# Patient Record
Sex: FEMALE | Race: WHITE | URBAN - METROPOLITAN AREA
[De-identification: names, ages, dates, MRNs, and addresses within clinical notes are randomized per-mention and may not be internally consistent; named-entity substitution may affect disease eponyms.]

---
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
## 2022-01-12 ENCOUNTER — OFFICE VISIT (OUTPATIENT)
Dept: OBGYN CLINIC | Age: 22
End: 2022-01-12
Payer: COMMERCIAL

## 2022-01-12 VITALS
SYSTOLIC BLOOD PRESSURE: 112 MMHG | BODY MASS INDEX: 39.61 KG/M2 | DIASTOLIC BLOOD PRESSURE: 70 MMHG | HEIGHT: 64 IN | WEIGHT: 232 LBS

## 2022-01-12 DIAGNOSIS — Z30.431 ENCOUNTER FOR ROUTINE CHECKING OF INTRAUTERINE CONTRACEPTIVE DEVICE (IUD): ICD-10-CM

## 2022-01-12 DIAGNOSIS — N94.6 DYSMENORRHEA: Primary | ICD-10-CM

## 2022-01-12 PROCEDURE — 99212 OFFICE O/P EST SF 10 MIN: CPT | Performed by: ADVANCED PRACTICE MIDWIFE

## 2022-01-12 ASSESSMENT — ENCOUNTER SYMPTOMS
CONSTIPATION: 0
DIARRHEA: 0
NAUSEA: 0
ABDOMINAL PAIN: 0
COUGH: 0
SHORTNESS OF BREATH: 0
VOMITING: 0

## 2022-01-12 ASSESSMENT — PATIENT HEALTH QUESTIONNAIRE - PHQ9
SUM OF ALL RESPONSES TO PHQ QUESTIONS 1-9: 0
2. FEELING DOWN, DEPRESSED OR HOPELESS: 0
SUM OF ALL RESPONSES TO PHQ9 QUESTIONS 1 & 2: 0
SUM OF ALL RESPONSES TO PHQ QUESTIONS 1-9: 0
SUM OF ALL RESPONSES TO PHQ QUESTIONS 1-9: 0
1. LITTLE INTEREST OR PLEASURE IN DOING THINGS: 0
SUM OF ALL RESPONSES TO PHQ QUESTIONS 1-9: 0

## 2022-01-12 NOTE — PROGRESS NOTES
SUBJECTIVE:  Shantell Cat is a 23 y.o. female who presents here today for complaints of:      Chief Complaint   Patient presents with    Medication Check     IUD, no c/o     IUD Surveillance   Utilizing hormonal contraception to relieve uncomfortable menstrual symptoms. Happy with current contraceptive method, wishes to continue. Review of Systems   Respiratory: Negative for cough and shortness of breath. Gastrointestinal: Negative for abdominal pain, constipation, diarrhea, nausea and vomiting. Genitourinary: Negative for difficulty urinating, dysuria, menstrual problem, pelvic pain, vaginal bleeding and vaginal discharge. All other systems reviewed and are negative. OBJECTIVE:  Vitals:  /70   Ht 5' 4\" (1.626 m)   Wt 232 lb (105.2 kg)   BMI 39.82 kg/m²     Physical Exam  Appearance:  Normal appearance  Cardiovascular:  Normal rate, Capillary refill less than 2 seconds  Pulmonary:  Normal effort, no distress  Abdominal:  No tenderness  MS:  No Swelling, No dependent edema  Skin:  Warm, dry  Neuro:  Alert and oriented x3, reflexes normal.  Psychiatric:  Normal mood and behavior    ASSESSMENT & PLAN:   Diagnosis Orders   1. Dysmenorrhea     2. Encounter for routine checking of intrauterine contraceptive device (IUD)         1. Dysmenorrhea  Utilizing hormonal contraception to relieve uncomfortable menstrual symptoms. 2. IUD Surveillance  Happy with IUD, denies adverse side effects, wishes to continue. Return if symptoms worsen or fail to improve.     VILLA Tobar CNM

## 2023-01-13 ENCOUNTER — OFFICE VISIT (OUTPATIENT)
Dept: FAMILY MEDICINE CLINIC | Age: 23
End: 2023-01-13
Payer: COMMERCIAL

## 2023-01-13 VITALS
TEMPERATURE: 97.9 F | WEIGHT: 211.8 LBS | HEART RATE: 64 BPM | OXYGEN SATURATION: 99 % | DIASTOLIC BLOOD PRESSURE: 76 MMHG | HEIGHT: 64 IN | SYSTOLIC BLOOD PRESSURE: 108 MMHG | RESPIRATION RATE: 14 BRPM | BODY MASS INDEX: 36.16 KG/M2

## 2023-01-13 DIAGNOSIS — G43.009 MIGRAINE WITHOUT AURA AND WITHOUT STATUS MIGRAINOSUS, NOT INTRACTABLE: ICD-10-CM

## 2023-01-13 DIAGNOSIS — F41.9 ANXIETY: ICD-10-CM

## 2023-01-13 PROCEDURE — 99214 OFFICE O/P EST MOD 30 MIN: CPT

## 2023-01-13 RX ORDER — SERTRALINE HYDROCHLORIDE 100 MG/1
TABLET, FILM COATED ORAL
COMMUNITY
Start: 2022-11-08

## 2023-01-13 RX ORDER — RIMEGEPANT SULFATE 75 MG/75MG
TABLET, ORALLY DISINTEGRATING ORAL
COMMUNITY
Start: 2022-10-21

## 2023-01-13 RX ORDER — TOPIRAMATE 100 MG/1
TABLET, FILM COATED ORAL
COMMUNITY
Start: 2023-01-04

## 2023-01-13 SDOH — ECONOMIC STABILITY: FOOD INSECURITY: WITHIN THE PAST 12 MONTHS, THE FOOD YOU BOUGHT JUST DIDN'T LAST AND YOU DIDN'T HAVE MONEY TO GET MORE.: NEVER TRUE

## 2023-01-13 SDOH — ECONOMIC STABILITY: FOOD INSECURITY: WITHIN THE PAST 12 MONTHS, YOU WORRIED THAT YOUR FOOD WOULD RUN OUT BEFORE YOU GOT MONEY TO BUY MORE.: NEVER TRUE

## 2023-01-13 ASSESSMENT — PATIENT HEALTH QUESTIONNAIRE - PHQ9
4. FEELING TIRED OR HAVING LITTLE ENERGY: 0
3. TROUBLE FALLING OR STAYING ASLEEP: 0
7. TROUBLE CONCENTRATING ON THINGS, SUCH AS READING THE NEWSPAPER OR WATCHING TELEVISION: 0
5. POOR APPETITE OR OVEREATING: 0
10. IF YOU CHECKED OFF ANY PROBLEMS, HOW DIFFICULT HAVE THESE PROBLEMS MADE IT FOR YOU TO DO YOUR WORK, TAKE CARE OF THINGS AT HOME, OR GET ALONG WITH OTHER PEOPLE: 0
9. THOUGHTS THAT YOU WOULD BE BETTER OFF DEAD, OR OF HURTING YOURSELF: 0
SUM OF ALL RESPONSES TO PHQ QUESTIONS 1-9: 2
SUM OF ALL RESPONSES TO PHQ9 QUESTIONS 1 & 2: 2
2. FEELING DOWN, DEPRESSED OR HOPELESS: 1
1. LITTLE INTEREST OR PLEASURE IN DOING THINGS: 1
SUM OF ALL RESPONSES TO PHQ QUESTIONS 1-9: 2
6. FEELING BAD ABOUT YOURSELF - OR THAT YOU ARE A FAILURE OR HAVE LET YOURSELF OR YOUR FAMILY DOWN: 0
8. MOVING OR SPEAKING SO SLOWLY THAT OTHER PEOPLE COULD HAVE NOTICED. OR THE OPPOSITE, BEING SO FIGETY OR RESTLESS THAT YOU HAVE BEEN MOVING AROUND A LOT MORE THAN USUAL: 0

## 2023-01-13 ASSESSMENT — SOCIAL DETERMINANTS OF HEALTH (SDOH): HOW HARD IS IT FOR YOU TO PAY FOR THE VERY BASICS LIKE FOOD, HOUSING, MEDICAL CARE, AND HEATING?: NOT HARD AT ALL

## 2023-01-13 ASSESSMENT — COLUMBIA-SUICIDE SEVERITY RATING SCALE - C-SSRS
6. HAVE YOU EVER DONE ANYTHING, STARTED TO DO ANYTHING, OR PREPARED TO DO ANYTHING TO END YOUR LIFE?: NO
2. HAVE YOU ACTUALLY HAD ANY THOUGHTS OF KILLING YOURSELF?: NO
1. WITHIN THE PAST MONTH, HAVE YOU WISHED YOU WERE DEAD OR WISHED YOU COULD GO TO SLEEP AND NOT WAKE UP?: NO

## 2023-01-13 ASSESSMENT — ENCOUNTER SYMPTOMS
CHEST TIGHTNESS: 0
COLOR CHANGE: 0
COUGH: 0
NAUSEA: 0
DIARRHEA: 0
SHORTNESS OF BREATH: 0

## 2023-01-13 NOTE — PROGRESS NOTES
Serene Cummings (: 2000) is a 26 y.o. female, New patient, who presents today for:    Chief Complaint   Patient presents with    New Patient     Patient presents today to establish care. Initiation of management for primary care. Patient has history of migraines and anxiety. Currently she  denies shortness of breath, chest pain, palpitations, fatigue. Worsening of depression or anxiety symptoms. Reports 30 pound weight loss over 3 months in the fall. Negative blood work was determined weight loss was most likely associated with new medications including Zoloft and increase in her Topamax. ASSESSMENT/PLAN    1. Anxiety  Assessment & Plan:  Past history of panic attacks and feelings of increased general anxiety. Denies past history of trauma or sexual abuse. Well-controlled, continue current medications Zoloft and Klonopin prescribed by Haylee Ramirez . Currently biweekly psychotherapy sessions with [facility]. She is in need of counselor that is not associated with school as they cannot continue ongoing therapy. Orders:  -     Ambulatory referral to Psychology  2. Migraine without aura and without status migrainosus, not intractable  Assessment & Plan:  Migraines are infrequent associated symptoms include photophobia and nausea. Well-controlled, continue current medications Topamax. Managed by DR Josefa Conte     Patient declined screening for gonorrhea chlamydia and HIV    Return in about 3 months (around 2023), or for PAP. SUBJECTIVE/OBJECTIVE:            Current Outpatient Medications on File Prior to Visit   Medication Sig Dispense Refill    NURTEC 75 MG TBDP DISSOLVE 1 TABLET ON THE TONGUE  [address]  DO NOT TAKE MORE THAN 1 TABLET EVERY 24 HR      sertraline (ZOLOFT) 100 MG tablet take 1 tablet by mouth once daily      topiramate (TOPAMAX) 100 MG tablet take 1 tablet by mouth twice a day      VITAMIN D PO Take 1,000 Units by mouth daily       Current Facility-Administered Medications on File Prior to Visit   Medication Dose Route Frequency Provider Last Rate Last Admin    levonorgestrel (MIRENA) IUD 52 mg 1 each  1 each IntraUTERine Once Waneta Pouch, APRN - CNM   1 each at 12/15/21 1333       No Known Allergies     Review of Systems   Constitutional:  Positive for unexpected weight change. Negative for fatigue. HENT: Negative. Eyes:  Negative for visual disturbance. Respiratory:  Negative for cough, chest tightness and shortness of breath. Cardiovascular:  Negative for chest pain, palpitations and leg swelling. Gastrointestinal:  Negative for diarrhea and nausea. Endocrine: Negative for polydipsia and polyuria. Genitourinary: Negative. Musculoskeletal: Negative. Skin:  Negative for color change. Neurological:  Negative for weakness, light-headedness, numbness and headaches. Hematological:  Bruises/bleeds easily. Psychiatric/Behavioral:  Negative for confusion. The patient is not nervous/anxious. Vitals:  /76 (Site: Left Upper Arm, Position: Sitting, Cuff Size: Large Adult)   Pulse 64   Temp 97.9 °F (36.6 °C) (Temporal)   Resp 14   Ht 5' 4\" (1.626 m)   Wt 211 lb 12.8 oz (96.1 kg)   SpO2 99%   BMI 36.36 kg/m²     No results found for: LABA1C     The ASCVD Risk score (Romi DK, et al., 2019) failed to calculate for the following reasons: The 2019 ASCVD risk score is only valid for ages 36 to 78     Physical Exam  Constitutional:       General: She is not in acute distress. Appearance: Normal appearance. HENT:      Head: Normocephalic. Nose: Nose normal.      Mouth/Throat:      Mouth: Mucous membranes are moist.   Eyes:      Conjunctiva/sclera: Conjunctivae normal.   Cardiovascular:      Rate and Rhythm: Normal rate. Pulses: Normal pulses. Heart sounds: Normal heart sounds. No murmur heard. No gallop.    Pulmonary:      Effort: Pulmonary effort is normal.      Breath sounds: Normal breath sounds. No wheezing or rhonchi.   Abdominal:      General: Bowel sounds are normal. There is no distension.   Musculoskeletal:         General: No swelling, tenderness, deformity or signs of injury. Normal range of motion.      Cervical back: Normal range of motion and neck supple. No rigidity.      Right lower leg: No edema.      Left lower leg: No edema.   Skin:     General: Skin is warm and dry.      Capillary Refill: Capillary refill takes less than 2 seconds.      Coloration: Skin is not pale.   Neurological:      General: No focal deficit present.      Mental Status: She is alert and oriented to person, place, and time.      Motor: No weakness.      Coordination: Coordination normal.      Gait: Gait normal.   Psychiatric:         Attention and Perception: Attention normal.         Mood and Affect: Mood normal. Mood is not anxious. Affect is not blunt.         Speech: Speech normal. Speech is not delayed.         Behavior: Behavior normal. Behavior is not agitated or hyperactive. Behavior is cooperative.         Thought Content: Thought content normal.         Cognition and Memory: Cognition and memory normal.         Judgment: Judgment normal.                     An electronic signature was used to authenticate this note.     Bruce Hayden, VILLA - CNP

## 2023-02-21 ENCOUNTER — OFFICE VISIT (OUTPATIENT)
Dept: BEHAVIORAL/MENTAL HEALTH CLINIC | Age: 23
End: 2023-02-21

## 2023-02-21 DIAGNOSIS — F41.9 ANXIETY: Primary | ICD-10-CM

## 2023-02-21 ASSESSMENT — PATIENT HEALTH QUESTIONNAIRE - PHQ9
SUM OF ALL RESPONSES TO PHQ9 QUESTIONS 1 & 2: 3
3. TROUBLE FALLING OR STAYING ASLEEP: 0
SUM OF ALL RESPONSES TO PHQ QUESTIONS 1-9: 12
7. TROUBLE CONCENTRATING ON THINGS, SUCH AS READING THE NEWSPAPER OR WATCHING TELEVISION: 2
6. FEELING BAD ABOUT YOURSELF - OR THAT YOU ARE A FAILURE OR HAVE LET YOURSELF OR YOUR FAMILY DOWN: 2
10. IF YOU CHECKED OFF ANY PROBLEMS, HOW DIFFICULT HAVE THESE PROBLEMS MADE IT FOR YOU TO DO YOUR WORK, TAKE CARE OF THINGS AT HOME, OR GET ALONG WITH OTHER PEOPLE: 1
2. FEELING DOWN, DEPRESSED OR HOPELESS: 2
5. POOR APPETITE OR OVEREATING: 2
8. MOVING OR SPEAKING SO SLOWLY THAT OTHER PEOPLE COULD HAVE NOTICED. OR THE OPPOSITE, BEING SO FIGETY OR RESTLESS THAT YOU HAVE BEEN MOVING AROUND A LOT MORE THAN USUAL: 2
SUM OF ALL RESPONSES TO PHQ QUESTIONS 1-9: 12
SUM OF ALL RESPONSES TO PHQ QUESTIONS 1-9: 12
1. LITTLE INTEREST OR PLEASURE IN DOING THINGS: 1
4. FEELING TIRED OR HAVING LITTLE ENERGY: 1
SUM OF ALL RESPONSES TO PHQ QUESTIONS 1-9: 12
9. THOUGHTS THAT YOU WOULD BE BETTER OFF DEAD, OR OF HURTING YOURSELF: 0

## 2023-02-21 NOTE — PATIENT INSTRUCTIONS
1. Dedicate 5 minutes 3x/day to practice the deep breathing    2. Review the list of apps and give some a try! [facility] Box, CBTi  and progressive muscle relaxation for sleep, etc.)    3. Read the below information about stress management    4. F/U as scheduled    \"The entire autonomic nervous system (and through it, our internal organs and glands) is largely driven by our breathing patterns. By changing our breathing we can influence millions of biochemical reactions in our body, producing more relaxing substances such as endorphins and fewer anxiety-producing ones like adrenaline and higher blood acidity. Mindfulness of the breath is so effective that it is common to all meditative and prayer traditions. \" Anxiety Fear & Breathing - Breathing. com    \"When overcoming high levels of anxiety, it is important to learn the techniques of correct breathing. Many people who live with high levels of anxiety are known to breathe through their chest. Shallow breathing through the chest means you are disrupting the balance of oxygen and carbon dioxide necessary to be in a relaxed state. This type of breathing will perpetuate the symptoms of anxiety. \" [email or web address] com      Diaphragmatic Breathing             _____________________________________________________________________________  1. Sit in a comfortable position    2. Place one hand on your stomach and the other on your chest    3. Try to breathe so that only your stomach rises and falls    As you inhale, concentrate on your chest remaining relatively still while your stomach rises. It may be helpful for you to imagine that your pants are too big and you need to push your stomach out to hold them up. When exhaling, allow your stomach to fall in and the air to fully escape. Inhale slowly. You may choose to hold the air in for about a second. Exhale slowly. Dont push the air out, but just let the natural pressure of your body slowly move it out.     It is normal for this healthy method of breathing to feel a little awkward at first.  With practice, it will feel more natural.    Get your mind on your side    One other important factor in getting relaxed is your mind. Your mind and body are connected. The mind influences the body and the body influences the mind. What you do with your mind when you are trying to relax is very important. The key is to avoid thinking about stressful things. You can think about      Neutral things (e.g., counting, saying a word like calm or relax)   Pleasant things (e.g., imagining a pleasant place)    It is recommended that you practice 2 times per day, 10 minutes each time. ----------------------------------------------------------------------------------------------------------------------  ----------------------------------------------------------------------------------------------------------------------  ----------------------------------------------------------------------------------------------------------------------  ----------------------------------------------------------------------------------------------------------------------      CONTROLLED or MEASURED BREATHING EXERCISE: (YOU MAY WANT TO DOWNLOAD THE FREE ANTWAN \"VIRTUAL HOPE BOX\" TO PRACTICE THIS EXERCISE)    You can sit or stand, but be sure to soften up a little before you begin. Make sure your hands are relaxed, and your knees are soft. Drop your shoulders and let your jaw relax. Now breath in slowly through your nose and count to three, keep your shoulders down and allow your stomach to expand as you breathe in. Hold the breath for a moment. Now release your breath slowly and smoothly as you count to six. Repeat for a couple of minutes        It can be very helpful to use tools like relaxed breathing, muscle relaxation, and guided imagery/visualization to cope with stress, pain, anxiety and depression.  I recommend to all my patients that they try different techniques to find the ones that work best for them. Below are 2 websites that have several breathing, relaxation, and visualization exercises that you can listen to and download for free.    NetworkAffair.[email or web address] html  · Deep Breathing & Guided Relaxation Exercises (3)  · Guided Imagery/Visualization Exercises (5)  · Mindfulness & Meditation Exercises (3)  · Progressive Muscle Relaxation   · Soothing Instrumental Music (11)    [email or web address]  · Diaphragmatic Breathing   · Deep Breathing I   · Deep Breathing II   · Progressive Muscle Relaxation   · Guided Imagery: The Veset   · Guided Imagery: The [facility]   · Relaxing Phrases   · Just This Breath   · Increasing Awareness   · Sending Thoughts Away on Clouds  · Sending Thoughts Away on Leaves  · Sorting Into Boxes     -----------------------------------------------------  Below are several apps that you can download to your smartphone to help with relaxation and mood coping. [email or web address]  Platform: [employer] & SeptRx  Cost: Free  Your breathing has a profound effect on your body. iNrali Oliver know this fact to be true if youve ever taken deep breaths to calm yourself down when you were upset. That exercise can often make you feel more centered, and its proof that breathing is powerful. The [id number] leydi uses guided breathing exercises to help reduce symptoms of an anxiety attack. If an attack is coming or the symptoms are unbearable, slip away into a quiet room, open your leydi, and let the worry and stress slip away with each breath. Universal Breathing - Pranayama Free  Platform: [employer]  Cost: Free  Focused breathing exercises can help you regain composure during an anxiety attack. They can also help you prevent an anxiety attack before one starts. Pranayama breathing techniques are common in yoga and have powerful benefits.  If youre a beginner, you can benefit from the leydis guided breathing instruction. Renetta Fernandez learn how to breathe deeply, hold, and then release with better control. If it works for you, you can purchase the full course which gives you access to the entire program.  Breathing Zone   Platform: [employer] & Android  Cost: $3.99   Breathing Zone uses a clinically proven therapeutic breathing exercise that decreases your heart rate, and with daily use can help manage high blood pressure.    ----------------------------------------------  Self-Help for Anxiety Management (BROOK)  Platform: iPhone & Android  Cost: Free  The Self-Help for Anxiety Management (BROOK) leydi from the [employer] can help you regain control of your anxiety and emotions. Tell the leydi how youre feeling, how anxious you are, or how worried you are. Then let the leydis self-help features walk you through some calming or relaxation practices. If you want, you can connect with a social network of other [facility] users. Dont worry, the network isnt connected to larger networks like Twitter or [employer]. Stop Panic & Anxiety Help  Platform: Android  Cost: Free  If panic and anxiety attacks have a  on your life, this leydi might help you let them go. The Stop Panic & Anxiety Help Android leydi uses emotion and relaxation training audio tracks to help you fight your fears and find a state of calm. When youve overcome the attack, use the OPHTHONIX journal to record what caused the attack and how you were able to get through it. Then use this journal to learn from your experiences and prepare for the future. I Can Be Fearless by [employer]  Platform: [employer]  Cost: Free  When you were younger, your parents might have told you that you could do anything you put your mind to. This leydi might not help you be an astronaut or a world famous actress, but it can help you break through your anxiety, fears, and worries to a place of calm and confidence.  Open your Apple device and select what you want to be right now -- calm, motivated, and confident are among the options -- then let the audio hypnosis guide you through a session. Anti-Anxiety   Platform: Android  Cost: Free  You can tell the leydi your problems by taking a diagnostic quiz about your level of stress and anxiety. Using your answers, the leydi will design a custom treatment plan for you. Follow instructional self-help videos like How to Tolerate and Lessen Anxiety.  Keep a daily journal of your anxiety and worries, and track your progress as you learn to regain a sense of calm. Worry Box - Anxiety Self-Help  Platform: Android  Cost: Free  Have you ever wished you could put all your worries in a box, leave them there, and walk away? The Worry Box leydi may let you do just that. The leydi functions a lot like a journal: Write down your thoughts, anxieties, and worries, and let the leydi help you think them through. It will ask questions, give specific anxiety-reducing help, and can even direct you to help you reduce your worries and anxiety. It is all password protected, so you can feel safe sharing the details of your stresses. -----------------------------------------------  Relax Melodies  Platform: iPhone and Android  Cost: Free  Anxiety can disrupt healthy sleep patterns in more than one way. First, people who dont get enough sleep tend to feel more anxious. Then, people who are more anxious have a difficult time sleeping. Creating a calming environment may help you fall asleep and stay asleep. Relax to one of this leydis 50 sounds. Need the music to stop once youre asleep? Set a timer, and it will stop playing. Set an alarm when you need to be awake. Then, enjoy the benefits of a good nights sleep, free from anxiety. Relaxing Sounds of Nature - Lite  Platform: iPhone  Cost: Free  You can find rest and relaxation without having to travel.  The leydi comes with 35 nature tracks, which include soothing classics like crickets chirping, breaking waves, and a serene lake. You can download more free tracks to [place] [employer], and customize a favorite combination that helps you reduce your anxiety in a peaceful setting. Allow the sounds of nature to sweep you away from your worries in the comfort of your living room, office, or bedroom. Nature Sounds Relax and Sleep  Platform: Android  Cost: Free  If you find yourself longing for the sound of the ocean to help you relax, the AcceloWebnifin and Sleep leydi is for you. Open this leydi whenever youre feeling anxious or stressed. You can select locations or sounds like the jungle, ocean, or thunder and slip away into a place of relaxation and comfort. If the sounds make you feel sleepy, even better. Use the leydi to doze off into a relaxing slumber  Calming Music to Simplicity  Platform: Android  Cost: Free  [employer] arent the only relaxing tunes in smartphone apps. Music, especially some traditional [employer] music, can be relaxing and soothing. The Calming Music to Simplicity leydi contains nine traditional [employer] music selections. Press play and let your worries melt away. Relax Ocean Waves Sleep by [employer]  Platform: Android  Cost: Free  Living far from a beach doesnt mean you have to be far from total relaxation. Slip on a set of headphones and drift into a distant sand-and-suds oasis. Whether youre trying to head off an anxiety attack or just need to get some good sleep after a few anxious days, the Relax Ocean Waves Sleep leydi helps you find a place of serenity.  --------------------------------------------------------------  Yaa Dickerson Meditation Relaxation  Platform: Android  Cost: Free  Yaa Dickerson is a traditional [facility] system that brings together posture, breathing, and the mind to reduce anxiety. This Android leydi connects users with a library of relaxation videos that contain instructions for relaxing and clearing the mind.  The videos are created by Dr. David Sykes, a psychologist with more than 20 years of experience. In addition to viewing Dr. Pittman Section videos, you can read a variety of articles related to anxiety, meditation, and stress management. Anxiety Free  Platform: [employer]   Cost: Free  Meditation requires mindfulness and a sense of presence in your thoughts. Hypnosis is one step beyond meditation. It works by sending signals to your brain and transforming it almost unknowingly. The creators of this leydi say that its audio recordings contain subliminal signals that speak to the subconscious with powerful effect.  Hypnosis, like meditation, requires practice, and the goal is to get each user to a place where self-hypnosis is possible in order to reduce anxiety. Relax & Rest Guided Meditations  Platform: Songwhale and N-1-1  Cost: $0.99  While group meetings and discussions are always an option, some people find relaxation more easily on their own. This leydi lets you relax in the space of your own home or office with three guided meditations. Breath Awareness Guided Meditation (5 minutes), Deep Rested Guided Meditation (13 minutes), and Whole Body Guided Relaxation (24 minutes) are designed specifically to help you relax and sink into a peaceful meditation moment. Equanimity - Meditation Timer & Tracker  Platform: Song[employer]ale   Cost: $4.99  Meditation is one way you can cope with the symptoms and side effects of anxiety. People who meditate can eventually train themselves to stay calm when feeling stressed or anxious. Equanimity - Meditation Timer & Tracker is simple and straightforward. Time each session and watch for visual light cues to let you know how long youve been meditating. Take notes about each session, and watch your progress as you learn to manage your stress and anxiety.   Virtual Hope Box  Platform: iPhone and Android  Cost: Free  The Automatic Data Box (VHB) is a smartphone application that contains simple tools to help with coping, relaxation, distraction, and positive thinking via personalized supportive audio, video, pictures, games, mindfulness exercises, positive messages and activity planning, inspirational quotes, coping statements, and other tools. Acupressure: Heal Yourself  Platform: iPhone and Android  Cost: $1.99  Acupressure is a natural healing strategy in which you target specific areas of the body in order to alleviate pain or unwanted symptoms. Acupressure can also increase blood flow, which can boost your mood and your health. This leydi helps you find your bodys acupressure points. Apply pressure on those points when youre feeling overwhelmed, and receive the positive, calming benefit  PTSD : Self-Management of Posttraumatic Stress  Platform: iPhone and Android  Cost: Free  This leydi can help you learn about and manage symptoms that often occur after trauma. Provides information and coping skills for common kinds of posttraumatic stress symptoms and problems, including systematic relaxation and self-help techniques. [place]: Feel better and live happier today  Platform: iPhone  Cost: Free  Daily mood and health tracking as well as journaling. Activities are based on mindfulness, relaxation and Cognitive Behavioral Therapy. Online support, networking and emails. CBT-i : Cognitive Behavioral Therapy for Insomnia  Platform: iPhone  Cost: Free  Identify sleep patterns with a sleep diary and assessment, tools to create new sleep habits, quiet your mind and prevent insomnia in the future. You can set reminders and learn about healthy sleep habits. Mindfulness   Platform: iPhone  Cost: Free  Mindfulness practice to decrease stress, manage emotional discomfort, depression, physical pain, and other problems. It offers exercises, information, and a tracking log to that you can optimize practice.    Mindsail  Platform: iPhone  Cost: Free for the first month then $5.99/month for full access  [employer] is a platform to discover original content from top thought-leaders and experts across relationship, career, anxiety, sleep, addiction and more. Their proprietary Programs and Moodboosts help users gain new perspectives and tools to change behaviors and live life in forward motion. Funky Moves features:  Programs   Created exclusively for Funky Moves, programs are designed to give users unique insights and tools to feed their appetite for personal growth. Users can listen to bite-sized Coaching sessions and learn new tools such as meditations, breathing exercises, visualizations and affirmations. By offering a holistic, multi-faceted approach, users will be able to reach peak mental performance and gain a deeper understanding of their physical, emotional and spiritual well-being. Moodboost   On a daily basis, people experience a wide range of emotions. Whether they're nervous about a work meeting, having trouble sleeping, or need an extra boost of confidence before their date, [employer]oves's quick daily Moodboosts can help turn a user's negative thoughts into a positive state of mind. [employer]   Users can track progress and star their favorite sessions and Moodboosts to help them stay on course of their personal growth journey. Users can create a daily mental wellness routine to help them form healthier habits and change behaviors. Ask the Experts   Users can submit personal questions to be answered by [employer] experts and see those posed by the community. They will also get Smooth Sailing tips and Words of [place] to help users navigate their day.

## 2023-02-21 NOTE — PROGRESS NOTES
Behavioral Health Consultation  Gertrude Pettit, Ph.D., [facility]  Psychologist  2/21/23  2:59 PM EST      Time spent with Patient: 30 minutes  [facility]. Reason for Consult:  anxiety  Referring Provider: VILLA West CNP    Pt (and/or legal guardian) provided informed consent for the behavioral health program. Discussed with patient model of service to include the limits of confidentiality (i.e. abuse reporting, suicide intervention, etc.) and short-term intervention focused approach. Also discussed limits of evaluation/services as not forensic in nature, cannot be used for purposes of custody evaluations, disability determination, or to meet requirements of court-ordered treatment. Pt (and/or legal guardian) indicated understanding. Feedback given to PCP. S:    Pt reports a history of MH treatment, last occurring through student services last semester and has a psychiatrist she meets with monthly at [facility]. Pt notes an interest in long-term care. Pt initiated the referral as she is looking for more regular and long term MH treatment for anxiety and depression. Pt lives in an apartment with a roommate. Pt is origianlly from [place] and has been in [place] for Three, is in suma year and studying classical civilization and art history and [place] minor. Pt intends to pursue grad school but may consider a gap year before seekign a career in [employer] or [email or web address] work. Pt reports a good relationship with roommate. Pt is in a relationship for two years and describes it as \"pretty good\" but notes impact of long-distance as he is studying in [employer], first serious relationship. Pt lives with mom and mom's boyfriend when at home. Pt describes a close relationship with mom and \"deals with\" mom's boyfriend. Pt's bio father lives 3 hrs south of home in [place], they  about age 13 and she has regular contact but they don't see each other often.  Pt has no siblings. Pt does have some extended family in [place]. Pt reports good friends in her life. Pt is active on social media. Pt enjoys crafts, pedro, knit, pottery, theatre costume design, sings. 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 Pt drinks etoh about 2x/week about 2 glasses of wine. Pt uses THC less often here then when in [place], about 1-4x/week about one joint. Pt has been taking Zoloft since 9/22 and notes excellent benefit to her anxiety. No previous MH meds, no known history of MH concerns, possibly a paternal aunt. Pt notes in highschool she had some scratching and pinching behaviors when experiencing panic. Some disordered eating, restricting and binging in High school. Pt notes Freshman year of college was difficult with adjusting to eating in front of people. Describes some unhealthy thoughts around eating but no behaviors. Pt plans to travel to [facility] and [facility] over spring break next month. Pt denies SI/HI    O:  MSE:    Appearance    alert, cooperative  Appetite abnormal  Sleep disturbance No  Fatigue Yes  Loss of pleasure Yes  Impulsive behavior at times  Speech    spontaneous, normal rate, and normal volume  Mood    Appropriate  Affect    normal affect  Thought Content    intact  Thought Process    coherent  Associations    logical connections  Insight    Good  Judgment    Intact  Orientation    oriented to person, place, time, and general circumstances  Memory    recent and remote memory intact  Attention/Concentration    intact in appt  Morbid ideation No  Suicide Assessment    no suicidal ideation      History:    Medications:   Current Outpatient Medications   Medication Sig Dispense Refill    NURTEC 75 MG TBDP DISSOLVE 1 TABLET ON THE TONGUE AT ONSET OF MIGRAINE.  DO NOT TAKE MORE THAN 1 TABLET EVERY 24 HR      sertraline (ZOLOFT) 100 MG tablet take 1 tablet by mouth once daily      topiramate (TOPAMAX) 100 MG tablet take 1 tablet by mouth twice a day      VITAMIN D PO Take 1,000 Units by mouth daily       Current Facility-Administered Medications   Medication Dose Route Frequency Provider Last Rate Last Admin    levonorgestrel (MIRENA) IUD 52 mg 1 each  1 each IntraUTERine Once Waneta Pouch, APRN - CNM   1 each at 12/15/21 1333       Social History:   Social History     Socioeconomic History    Marital status: Single     Spouse name: Not on file    Number of children: Not on file    Years of education: Not on file    Highest education level: Not on file   Occupational History    Not on file   Tobacco Use    Smoking status: Never    Smokeless tobacco: Never   Vaping Use    Vaping Use: Never used   Substance and Sexual Activity    Alcohol use: Yes    Drug use: Yes     Types: Marijuana Garon Cantrell)    Sexual activity: Not Currently   Other Topics Concern    Not on file   Social History Narrative    Not on file     Social Determinants of Health     Financial Resource Strain: Low Risk     Difficulty of Paying Living Expenses: Not hard at all   Food Insecurity: No Food Insecurity    Worried About Running Out of Food in the Last Year: Never true    Ran Out of Food in the Last Year: Never true   Transportation Needs: Not on file   Physical Activity: Not on file   Stress: Not on file   Social Connections: Not on file   Intimate Partner Violence: Not on file   Housing Stability: Not on file       TOBACCO:   reports that she has never smoked. She has never used smokeless tobacco.  ETOH:   reports current alcohol use. Family History:   Family History   Problem Relation Age of Onset    Alcohol Abuse Mother     High Blood Pressure Mother     Diabetes Paternal Grandmother     Heart Disease Paternal Grandfather     Diabetes Paternal Grandfather          A:  Administered the PHQ9 which indicates a self report of moderate symptom distress. Pt would benefit from [facility] services to increase coping skills to provide symptom management/control/relief.        PHQ Scores 2/21/2023 1/13/2023 1/12/2022   PHQ2 Score 3 2 0   PHQ9 Score 12 2 0     Interpretation of Total Score Depression Severity: 1-4 = Minimal depression, 5-9 = Mild depression, 10-14 = Moderate depression, 15-19 = Moderately severe depression, 20-27 = Severe depression      Diagnosis:    Unspecified Anxiety  R/O JANE  R/O mood disorder      Diagnosis Date    Migraine            Plan:  Pt interventions:  Established rapport, Conducted functional assessment, [place]-setting to identify pt's primary goals for [facility] visit / overall health, Supportive techniques, Provided Psychoeducation re: anxiety, depression and stress management, Explained relaxed breathing technique in detail and practiced this with pt in visit, Emphasized importance of regular practice of relaxation strategies to target / promote symptom relief, and Provided pt list of websites and several smartphone leydi resources for further practicing guided meditations and breathing exercises      Pt Behavioral Change Plan:    1. Dedicate 5 minutes 3x/day to practice the deep breathing    2. Review the list of apps and give some a try! [facility], [employer]BTi  and progressive muscle relaxation for sleep, etc.)    3. Read the below information about stress management    4. F/U as scheduled    Please note this report has been partially produced using speech recognition software  And may cause contain errors related to that system including grammar, punctuation and spelling as well as words and phrases that may seem inappropriate. If there are questions or concerns please feel free to contact me to clarify.

## 2023-02-24 ENCOUNTER — OFFICE VISIT (OUTPATIENT)
Dept: FAMILY MEDICINE CLINIC | Age: 23
End: 2023-02-24

## 2023-02-24 ENCOUNTER — HOSPITAL ENCOUNTER (OUTPATIENT)
Age: 23
Setting detail: SPECIMEN
Discharge: HOME | End: 2023-02-24

## 2023-02-24 VITALS
SYSTOLIC BLOOD PRESSURE: 122 MMHG | BODY MASS INDEX: 37.39 KG/M2 | HEART RATE: 83 BPM | OXYGEN SATURATION: 98 % | TEMPERATURE: 98.4 F | DIASTOLIC BLOOD PRESSURE: 66 MMHG | HEIGHT: 64 IN | WEIGHT: 219 LBS

## 2023-02-24 DIAGNOSIS — Z01.419 ENCOUNTER FOR WELL WOMAN EXAM WITH ROUTINE GYNECOLOGICAL EXAM: Primary | ICD-10-CM

## 2023-02-24 DIAGNOSIS — Z01.419 ENCOUNTER FOR WELL WOMAN EXAM WITH ROUTINE GYNECOLOGICAL EXAM: ICD-10-CM

## 2023-02-24 LAB — HPV COMMENT: NORMAL

## 2023-02-24 NOTE — PROGRESS NOTES
[facility] [address] Encounter        ASSESSMENT/PLAN     Reba Clement is a 26 y.o. female who presents with:  Procedure for Pap exam with high risk HPV testing. She denies vaginal discharge vaginal pain or pelvic pain. With Madi WILLIS as chaperone patient was examined. She denies pelvic pain or adnexal tenderness. External vaginal structures within normal limits. Internal vaginal walls within normal limits os appears normal milky white discharge. Pap specimen collected using endocervical broom. Patient tolerated the procedure well. Educated patient she may have small amount of spotting over the next several hours if she experiences large amounts of bleeding she should notify the office. 1. Encounter for well woman exam with routine gynecological exam  -     Pap Smear; Future           PATIENT REFERRED TO:  No follow-ups on file. DISCHARGE MEDICATIONS:  New Prescriptions    No medications on file     Cannot display discharge medications since this is not an admission. Dorothey Romberg, VILLA - CNP    CHIEF COMPLAINT       Chief Complaint   Patient presents with    Gynecologic Exam         SUBJECTIVE/REVIEW OF SYSTEMS     Review of Systems   Constitutional:  Negative for chills, fatigue and fever. Respiratory: Negative. Cardiovascular: Negative. Gastrointestinal:  Negative for abdominal pain. Endocrine: Negative. Negative for polyphagia and polyuria. Genitourinary:  Negative for difficulty urinating, dysuria, genital sores, hematuria, menstrual problem, pelvic pain, vaginal bleeding, vaginal discharge and vaginal pain. Musculoskeletal:  Negative for back pain and myalgias. Skin: Negative. Neurological: Negative. Hematological: Negative. Negative for adenopathy. Psychiatric/Behavioral: Negative. OBJECTIVE/PHYSICAL EXAM     Physical Exam  Vitals reviewed. Exam conducted with a chaperone present.    Constitutional:       Appearance: Normal appearance. She is not ill-appearing or toxic-appearing. Cardiovascular:      Rate and Rhythm: Normal rate. Pulmonary:      Effort: Pulmonary effort is normal. No respiratory distress. Abdominal:      General: Abdomen is flat. There is no distension. Palpations: Abdomen is soft. There is no mass. Tenderness: There is no abdominal tenderness. Hernia: There is no hernia in the left inguinal area or right inguinal area. Genitourinary:     General: Normal vulva. Exam position: Lithotomy position. Pubic Area: No rash or pubic lice. Jaycob stage (genital): 5. Labia:         Right: No rash, tenderness, lesion or injury. Left: No rash, tenderness, lesion or injury. Urethra: No prolapse or urethral pain. Vagina: Normal.      Cervix: Discharge [facility]) present. No friability, lesion or erythema. Uterus: Not tender. Adnexa: Right adnexa normal and left adnexa normal.        Right: No mass or tenderness. Left: No mass or tenderness. Musculoskeletal:         General: Normal range of motion. Skin:     General: Skin is warm and dry. Capillary Refill: Capillary refill takes less than 2 seconds. Coloration: Skin is not pale. Neurological:      Mental Status: She is alert and oriented to person, place, and time. Psychiatric:         Mood and Affect: Mood normal.         Behavior: Behavior normal.       VITALS  BP: 122/66, Temp: 98.4 °F (36.9 °C), Temp Source: Temporal, Heart Rate: 83,  , SpO2: 98 %      PAST MEDICAL HISTORY         Diagnosis Date    Migraine      SURGICAL HISTORY     Patient  has a past surgical history that includes Tonsillectomy. CURRENT MEDICATIONS       Previous Medications    NURTEC 75 MG TBDP    DISSOLVE 1 TABLET ON THE TONGUE AT ONSET OF MIGRAINE.  DO NOT TAKE MORE THAN 1 TABLET EVERY 24 HR    SERTRALINE (ZOLOFT) 100 MG TABLET    take 1 tablet by mouth once daily    TOPIRAMATE (TOPAMAX) 100 MG TABLET take 1 tablet by mouth twice a day    VITAMIN D PO    Take 1,000 Units by mouth daily     ALLERGIES     Patient is has No Known Allergies. FAMILY HISTORY     Patient'sfamily history includes Alcohol Abuse in her mother; Diabetes in her paternal grandfather and paternal grandmother; Heart Disease in her paternal grandfather; High Blood Pressure in her mother. HISTORY     Patient  reports that she has never smoked. She has never used smokeless tobacco. She reports current alcohol use. She reports current drug use. Drug: Marijuana Jamila Cam). READY CARE COURSE     Orders Placed This Encounter   Procedures    Pap Smear     Patient History:    No LMP recorded. OBGYN Status: Having periods  Past Surgical History:  No date: TONSILLECTOMY  Medications/Contraceptives Affecting Cytology     Progestin Contraceptives - IUD Disp Start End     levonorgestrel (MIRENA) IUD 52 mg 1 each     12/15/2021      1 each, IntraUTERine, ONCE        Social History    Tobacco Use      Smoking status: Never      Smokeless tobacco: Never       Standing Status:   Future     Standing Expiration Date:   2/24/2024     Order Specific Question:   Collection Type     Answer: Thin Prep     Order Specific Question:   Prior Abnormal Pap Test     Answer:   No     Order Specific Question:   Screening or Diagnostic     Answer:   Screening     Order Specific Question:   HPV Requested? Answer:   Yes     Order Specific Question:   High Risk Patient     Answer:   N/A        Labs:  No results found for this visit on 02/24/23. IMAGING:  No orders to display     Scheduled Meds:   levonorgestrel  1 each IntraUTERine Once     Continuous Infusions:  PRN Meds:.

## 2023-03-07 ENCOUNTER — OFFICE VISIT (OUTPATIENT)
Dept: BEHAVIORAL/MENTAL HEALTH CLINIC | Age: 23
End: 2023-03-07
Payer: COMMERCIAL

## 2023-03-07 DIAGNOSIS — F41.9 ANXIETY: Primary | ICD-10-CM

## 2023-03-07 PROCEDURE — 90832 PSYTX W PT 30 MINUTES: CPT | Performed by: PSYCHOLOGIST

## 2023-03-07 ASSESSMENT — PATIENT HEALTH QUESTIONNAIRE - PHQ9
5. POOR APPETITE OR OVEREATING: 0
7. TROUBLE CONCENTRATING ON THINGS, SUCH AS READING THE NEWSPAPER OR WATCHING TELEVISION: 1
2. FEELING DOWN, DEPRESSED OR HOPELESS: 1
6. FEELING BAD ABOUT YOURSELF - OR THAT YOU ARE A FAILURE OR HAVE LET YOURSELF OR YOUR FAMILY DOWN: 2
SUM OF ALL RESPONSES TO PHQ QUESTIONS 1-9: 7
SUM OF ALL RESPONSES TO PHQ QUESTIONS 1-9: 7
10. IF YOU CHECKED OFF ANY PROBLEMS, HOW DIFFICULT HAVE THESE PROBLEMS MADE IT FOR YOU TO DO YOUR WORK, TAKE CARE OF THINGS AT HOME, OR GET ALONG WITH OTHER PEOPLE: 1
SUM OF ALL RESPONSES TO PHQ QUESTIONS 1-9: 7
3. TROUBLE FALLING OR STAYING ASLEEP: 0
8. MOVING OR SPEAKING SO SLOWLY THAT OTHER PEOPLE COULD HAVE NOTICED. OR THE OPPOSITE, BEING SO FIGETY OR RESTLESS THAT YOU HAVE BEEN MOVING AROUND A LOT MORE THAN USUAL: 1
4. FEELING TIRED OR HAVING LITTLE ENERGY: 1
SUM OF ALL RESPONSES TO PHQ QUESTIONS 1-9: 7
9. THOUGHTS THAT YOU WOULD BE BETTER OFF DEAD, OR OF HURTING YOURSELF: 0
SUM OF ALL RESPONSES TO PHQ9 QUESTIONS 1 & 2: 2
1. LITTLE INTEREST OR PLEASURE IN DOING THINGS: 1

## 2023-03-07 NOTE — PROGRESS NOTES
Behavioral Health Consultation  Gertrude Mauricio, Ph.D., [facility]  Psychologist  3/7/23  11:00 AM EST      Time spent with Patient: 30 minutes  This is patient's second  [facility] appointment. Reason for Consult:  anxiety  Referring Provider: VILLA Nevarez - CNP    Feedback given to PCP. S:   Pt reports doing \"pretty good\", has difficulty with emotional identification. Notes excitement of upcoming overseas travel. Pt  detailed some patterns in her thinking. Pt denies SI/HI    O:  MSE:    Appearance    alert, cooperative  Appetite normal  Sleep disturbance No  Fatigue No  Loss of pleasure No  Impulsive behavior at times  Speech    spontaneous, normal rate, and normal volume  Mood    Appropriate  Affect    normal affect  Thought Content    intact  Thought Process    coherent  Associations    logical connections  Insight    Good  Judgment    Intact  Orientation    oriented to person, place, time, and general circumstances  Memory    recent and remote memory intact  Attention/Concentration    intact  Morbid ideation No  Suicide Assessment    no suicidal ideation      History:    Medications:   Current Outpatient Medications   Medication Sig Dispense Refill    NURTEC 75 MG TBDP DISSOLVE 1 TABLET ON THE TONGUE AT ONSET OF MIGRAINE.  DO NOT TAKE MORE THAN 1 TABLET EVERY 24 HR      sertraline (ZOLOFT) 100 MG tablet take 1 tablet by mouth once daily      topiramate (TOPAMAX) 100 MG tablet take 1 tablet by mouth twice a day      VITAMIN D PO Take 1,000 Units by mouth daily       Current Facility-Administered Medications   Medication Dose Route Frequency Provider Last Rate Last Admin    levonorgestrel (MIRENA) IUD 52 mg 1 each  1 each IntraUTERine Once VILLA Hernandez - CNM   1 each at 12/15/21 1333       Social History:   Social History     Socioeconomic History    Marital status: Single     Spouse name: Not on file    Number of children: Not on file    Years of education: Not on file    Highest education level: Not on file   Occupational History    Not on file   Tobacco Use    Smoking status: Never    Smokeless tobacco: Never   Vaping Use    Vaping Use: Never used   Substance and Sexual Activity    Alcohol use: Yes    Drug use: Yes     Types: Marijuana Hulon Patricio)    Sexual activity: Not Currently   Other Topics Concern    Not on file   Social History Narrative    Not on file     Social Determinants of Health     Financial Resource Strain: Low Risk     Difficulty of Paying Living Expenses: Not hard at all   Food Insecurity: No Food Insecurity    Worried About [address] in the Last Year: Never true    [address] [place] in the Last Year: Never true   Transportation Needs: Unknown    Lack of Transportation (Medical): Not on file    Lack of Transportation (Non-Medical): No   Physical Activity: Not on file   Stress: Not on file   Social Connections: Not on file   Intimate Partner Violence: Not on file   Housing Stability: Unknown    Unable to Pay for Housing in the Last Year: Not on file    Number of Places Lived in the Last Year: Not on file    Unstable Housing in the Last Year: No       TOBACCO:   reports that she has never smoked. She has never used smokeless tobacco.  ETOH:   reports current alcohol use. Family History:   Family History   Problem Relation Age of Onset    Alcohol Abuse Mother     High Blood Pressure Mother     Diabetes Paternal Grandmother     Heart Disease Paternal Grandfather     Diabetes Paternal Grandfather          A:  Administered the PHQ9 which indicates a self report of mild symptom distress. Pt would benefit from [facility] services to increase coping skills to provide symptom management/control/relief.        PHQ Scores 3/7/2023 2/21/2023 1/13/2023 1/12/2022   PHQ2 Score 2 3 2 0   PHQ9 Score 7 12 2 0     Interpretation of Total Score Depression Severity: 1-4 = Minimal depression, 5-9 = Mild depression, 10-14 = Moderate depression, 15-19 = Moderately severe depression, 20-27 = Severe depression      Diagnosis:  Unspecified Anxiety  R/O JANE  R/O mood disorder      Diagnosis Date    Migraine            Plan:  Pt interventions:  Conducted functional assessment, [place]-setting to identify pt's primary goals for [facility] visit / overall health, Supportive techniques, Provided Psychoeducation re: the CBT model and introduction of mindfulness, CBT to target cognitive distortions by identifying unhelpful patterns of thinking, increasing awareness of physiological red flags when she might be engaged in that type of thinking, applying rational counter statements to increase balanced thinking, Identified maladaptive thoughts, and Emphasized importance of regular practice of relaxation strategies to target / promote symptom relief. Pt Behavioral Change Plan:    Feeling identification    1. Review the list of unhelpful thinking patterns and pick out any \"go tos\"  2. Pay attention to your body's red flags that you might be engaged in this type of thinking  3. Jump in and apply rational counter statements to increase the balance in your thinking  4. F/U as scheduled    Please note this report has been partially produced using speech recognition software  And may cause contain errors related to that system including grammar, punctuation and spelling as well as words and phrases that may seem inappropriate. If there are questions or concerns please feel free to contact me to clarify.

## 2023-03-07 NOTE — PATIENT INSTRUCTIONS
Feeling identification      1. Review the list of unhelpful thinking patterns and pick out any \"go tos\"  2. Pay attention to your body's red flags that you might be engaged in this type of thinking  3. Jump in and apply rational counter statements to increase the balance in your thinking  4. F/U as scheduled        Thinking Errors  provided by [employer] © 2012    Thinking errors (or cognitive distortions) are irrational thoughts that can influence your emotions. Everyone experiences cognitive distortions to some degree, but in their more extreme forms they can be harmful. Magnification and Minimization: Exaggerating or minimizing the importance of  events. One might believe their own achievements are unimportant, or that their  mistakes are excessively important. Catastrophizing: Seeing only the worst possible outcomes of a situation. Overgeneralization: Making broad interpretations from a single or few events. I felt  awkward during my job interview. I am always so awkward.     Magical Thinking: The belief that acts will influence unrelated situations. I am a good  person--bad things shouldnt happen to me.     Personalization: The belief that one is responsible for events outside of their own  control. My mom is always upset. She would be fine if I did more to help her.     Jumping to Conclusions: Interpreting the meaning of a situation with little or no  Evidence. Mind Reading: Interpreting the thoughts and beliefs of others without adequate  evidence. She would not go on a date with me. She probably thinks Im ugly.     Fortune Telling: The expectation that a situation will turn out badly without adequate evidence. Emotional Reasoning: The assumption that emotions reflect the way things really are. I feel like a bad friend, therefor I must be a bad friend.     Disqualifying the Positive: Recognizing only the negative aspects of a situation while  ignoring the positive.  One might receive many compliments on an evaluation, but focus  on the single piece of negative feedback. Should Statements: The belief that things should be a certain way. I should always  be friendly.     All-or-Nothing Thinking: Thinking in absolutes such as [employer], or every. I  never do a good enough job on anything.                       Automatic Thoughts (Provided by [employer] © 2013)    Our thoughts control how we feel about ourselves and the world around us. Positive thoughts lead to us feeling good and negative thoughts can put us down. Sometimes our thoughts happen so quickly that we fail to notice them, but they can still affect our mood. These are called automatic thoughts. Oftentimes, our automatic thoughts are negative and irrational. Identifying these negative  automatic thoughts and replacing them with new rational thoughts (or counterstatement) can improve our mood. Trigger   Automatic Thought     New Thought                                Example:I made a mistake at work. Im probably going to be fired. I always mess up. This is it. Im no good at this job. \"     I messed up, but mistakes happen. Im going to work through this, like I always do.                                                                                  Thought Log Provided by [employer] © 2012        Event     AutomaticThought     Behavior /  Consequence   Rational   Counterstatement       Example: Supervisor at work is angry. I must have made a mistake--now Ive done it. Theyll fire for me sure.      Feeling of sadness and anxiety/Spend time obsessing over mistakes   [facility] supervisor couldve been angry about anything. They are usually happy  with my work, so even if Ivlidia made a mistake, it isn't a big deal.\"                                                                            Countering Anxiety Provided by [email or web address] © 2012    Come up with a rational counterstatement for each of the following thoughts:    Anxiety-Producing Thought:       I cant go to the mall with my hair like this--everyone will notice me. Rational Counterstatement Example:  My hair looks a little messy, but everyone will be too occupied with other things to notice. Even if they do notice, I doubt they would care.    -------------------------------------------------------------------------------    Anxiety-Producing Thought:  I know I wont be able to finish my work on time. Rational Counterstatement Example:        --------------------------------------------------------------------------------    Anxiety-Producing Thought:  I cant face by boss. Shes going to yell at me. Rational Counterstatement Example:          Next, think of three examples of anxiety-producing thoughts and rational counterstatements from your own life:    Example 1:    Anxiety-Producing Thought:      Rational Counterstatement:      Example 2:    Anxiety-Producing Thought:      Rational Counterstatement:        Example 3:    Anxiety-Producing Thought:      Rational Counterstatement:               List of Emotions     Provided by [employer] © 2012      Amazed   Foolish   Overwhelmed    Angry    Frustrated   Peaceful    Annoyed   Furious   Proud    Anxious   Grieving   Relieved    Ashamed  Happy    Resentful    Bitter    Hopeful   Sad    Bored    Hurt    Satisfied    Comfortable   Inadequate   Scared    Confused  Insecure   Self-conscious    Content   Inspired   Shocked    Depressed   Irritated   Silly    Determined   Jealous   Stupid    Disdain   [place]    Suspicious    Disgusted  Lonely    Tense    Eager    Lost    Terrified    Embarrassed   [place]    Trapped    Energetic   Miserable   Uncomfortable    Envious   Motivated   Worried    Excited   Nervous   Worthless                                  Emotions Wheel

## 2024-02-24 ENCOUNTER — OFFICE VISIT (OUTPATIENT)
Dept: FAMILY MEDICINE CLINIC | Age: 24
End: 2024-02-24
Payer: COMMERCIAL

## 2024-02-24 VITALS
HEART RATE: 117 BPM | OXYGEN SATURATION: 98 % | WEIGHT: 220 LBS | TEMPERATURE: 97 F | SYSTOLIC BLOOD PRESSURE: 122 MMHG | HEIGHT: 64 IN | BODY MASS INDEX: 37.56 KG/M2 | DIASTOLIC BLOOD PRESSURE: 70 MMHG

## 2024-02-24 DIAGNOSIS — R52 BODY ACHES: ICD-10-CM

## 2024-02-24 DIAGNOSIS — R50.9 FEVER, UNSPECIFIED FEVER CAUSE: ICD-10-CM

## 2024-02-24 DIAGNOSIS — H66.93 ACUTE BILATERAL OTITIS MEDIA: Primary | ICD-10-CM

## 2024-02-24 LAB
INFLUENZA A ANTIBODY: ABNORMAL
INFLUENZA B ANTIBODY: ABNORMAL

## 2024-02-24 PROCEDURE — 99213 OFFICE O/P EST LOW 20 MIN: CPT | Performed by: NURSE PRACTITIONER

## 2024-02-24 PROCEDURE — 87804 INFLUENZA ASSAY W/OPTIC: CPT | Performed by: NURSE PRACTITIONER

## 2024-02-24 RX ORDER — AMOXICILLIN AND CLAVULANATE POTASSIUM 875; 125 MG/1; MG/1
1 TABLET, FILM COATED ORAL 2 TIMES DAILY
Qty: 20 TABLET | Refills: 0 | Status: SHIPPED | OUTPATIENT
Start: 2024-02-24 | End: 2024-03-05

## 2024-02-24 RX ORDER — BUPROPION HYDROCHLORIDE 150 MG/1
150 TABLET ORAL EVERY MORNING
COMMUNITY

## 2024-02-24 ASSESSMENT — PATIENT HEALTH QUESTIONNAIRE - PHQ9
SUM OF ALL RESPONSES TO PHQ QUESTIONS 1-9: 0
SUM OF ALL RESPONSES TO PHQ9 QUESTIONS 1 & 2: 0
SUM OF ALL RESPONSES TO PHQ QUESTIONS 1-9: 0
SUM OF ALL RESPONSES TO PHQ QUESTIONS 1-9: 0
2. FEELING DOWN, DEPRESSED OR HOPELESS: 0
SUM OF ALL RESPONSES TO PHQ QUESTIONS 1-9: 0
1. LITTLE INTEREST OR PLEASURE IN DOING THINGS: 0

## 2024-02-24 ASSESSMENT — ENCOUNTER SYMPTOMS
SORE THROAT: 1
NAUSEA: 1
EYE DISCHARGE: 0
EYE REDNESS: 0
DIARRHEA: 0
SINUS PAIN: 0
RHINORRHEA: 1
COUGH: 1
EYES NEGATIVE: 1
VOMITING: 0

## 2024-02-24 NOTE — PROGRESS NOTES
2024    Mylene Linares (:  2000) is a 24 y.o. female, Established patient, here for evaluation of the following chief complaint(s):  Chills, Generalized Body Aches, Congestion, Fever (Low grade fever ), Headache, and Sweats (X3 days tx: tylenol this morning / tested negative to covid )      Vitals:    24 0821   BP: 122/70   Pulse: (!) 117   Temp: 97 °F (36.1 °C)   SpO2: 98%     Results for orders placed or performed in visit on 24   POCT Influenza A/B   Result Value Ref Range    Influenza A Ab (NEG)     Influenza B Ab (NEG)        SUBJECTIVE/OBJECTIVE:    Pt comes in for cold symptoms for 3 days. States some of her friends have had colds but they have not been as bad. She took a covid test at home two times and both came back negative. She would like a flu test.     URI   This is a new problem. The current episode started in the past 7 days (3 days). The problem has been gradually worsening. Associated symptoms include congestion, coughing, headaches, nausea, rhinorrhea and a sore throat. Pertinent negatives include no diarrhea, ear pain, rash, sinus pain or vomiting. Treatments tried: sudafed, mucinex cold and flu. The treatment provided mild relief.       Review of Systems   Constitutional:  Positive for diaphoresis and fever (states 99 F something).   HENT:  Positive for congestion, rhinorrhea and sore throat. Negative for ear discharge, ear pain and sinus pain.    Eyes: Negative.  Negative for discharge and redness.   Respiratory:  Positive for cough.    Gastrointestinal:  Positive for nausea. Negative for diarrhea and vomiting.   Skin:  Negative for rash.   Neurological:  Positive for headaches.   Psychiatric/Behavioral: Negative.     All other systems reviewed and are negative.      Physical Exam  Vitals and nursing note reviewed.   Constitutional:       General: She is awake. She is not in acute distress.     Appearance: Normal appearance. She is not ill-appearing, toxic-appearing or  Can you make sure that the rx is going to the right place?

## 2024-03-05 ENCOUNTER — HOSPITAL ENCOUNTER (OUTPATIENT)
Dept: LAB | Age: 24
Discharge: HOME | End: 2024-03-05
Payer: COMMERCIAL

## 2024-03-05 ENCOUNTER — OFFICE VISIT (OUTPATIENT)
Dept: FAMILY MEDICINE CLINIC | Age: 24
End: 2024-03-05
Payer: COMMERCIAL

## 2024-03-05 VITALS
TEMPERATURE: 97.7 F | BODY MASS INDEX: 44.05 KG/M2 | HEIGHT: 64 IN | HEART RATE: 82 BPM | OXYGEN SATURATION: 98 % | SYSTOLIC BLOOD PRESSURE: 130 MMHG | DIASTOLIC BLOOD PRESSURE: 60 MMHG | WEIGHT: 258 LBS

## 2024-03-05 DIAGNOSIS — E66.01 MORBID OBESITY (HCC): ICD-10-CM

## 2024-03-05 DIAGNOSIS — Z11.4 SCREENING FOR HIV WITHOUT PRESENCE OF RISK FACTORS: ICD-10-CM

## 2024-03-05 DIAGNOSIS — Z00.01 ENCOUNTER FOR GENERAL ADULT MEDICAL EXAMINATION WITH ABNORMAL FINDINGS: Primary | ICD-10-CM

## 2024-03-05 DIAGNOSIS — Z11.3 SCREEN FOR STD (SEXUALLY TRANSMITTED DISEASE): ICD-10-CM

## 2024-03-05 PROCEDURE — 87389 HIV-1 AG W/HIV-1&-2 AB AG IA: CPT

## 2024-03-05 PROCEDURE — 99395 PREV VISIT EST AGE 18-39: CPT

## 2024-03-05 SDOH — ECONOMIC STABILITY: HOUSING INSECURITY
IN THE LAST 12 MONTHS, WAS THERE A TIME WHEN YOU DID NOT HAVE A STEADY PLACE TO SLEEP OR SLEPT IN A SHELTER (INCLUDING NOW)?: NO

## 2024-03-05 SDOH — ECONOMIC STABILITY: FOOD INSECURITY: WITHIN THE PAST 12 MONTHS, YOU WORRIED THAT YOUR FOOD WOULD RUN OUT BEFORE YOU GOT MONEY TO BUY MORE.: NEVER TRUE

## 2024-03-05 SDOH — ECONOMIC STABILITY: FOOD INSECURITY: WITHIN THE PAST 12 MONTHS, THE FOOD YOU BOUGHT JUST DIDN'T LAST AND YOU DIDN'T HAVE MONEY TO GET MORE.: NEVER TRUE

## 2024-03-05 SDOH — ECONOMIC STABILITY: INCOME INSECURITY: HOW HARD IS IT FOR YOU TO PAY FOR THE VERY BASICS LIKE FOOD, HOUSING, MEDICAL CARE, AND HEATING?: NOT VERY HARD

## 2024-03-05 ASSESSMENT — ENCOUNTER SYMPTOMS
RESPIRATORY NEGATIVE: 1
ALLERGIC/IMMUNOLOGIC NEGATIVE: 1
COUGH: 0
SHORTNESS OF BREATH: 0
EYES NEGATIVE: 1
GASTROINTESTINAL NEGATIVE: 1

## 2024-03-05 NOTE — PATIENT INSTRUCTIONS
Advance Care Planning     Advance Care Planning opens a door to talk about and write down your wishes before a sudden accident or illness.  Make your goals, values, and preferences known.     This puts you in the ’s seat and helps others know what matters most to you so they won’t have to guess.      Where can you learn more?    Go to [email or web address]   to learn how to:    Name someone you trust to make healthcare decisions for you, only if you can’t. (Healthcare Power of )    Document your wishes for care if you were seriously ill and not expected to recover or are approaching end of life. (Advance Directive or Living Will)    The same page can be found using the QR code below.                Body Mass Index: Care Instructions  Overview     Body mass index (BMI) can help you see if your weight is raising your risk for health problems. It uses a formula to compare how much you weigh with how tall you are.  A BMI lower than 18.5 is considered underweight.  A BMI between 18.5 and 24.9 is considered healthy.  A BMI between 25 and 29.9 is considered overweight. A BMI of 30 or higher is considered obese.  If your BMI is in the normal range, it means that you have a lower risk for weight-related health problems. If your BMI is in the overweight or obese range, you may be at increased risk for weight-related health problems, such as high blood pressure, heart disease, stroke, arthritis or joint pain, and diabetes. If your BMI is in the underweight range, you may be at increased risk for health problems such as fatigue, lower protection (immunity) against illness, muscle loss, bone loss, hair loss, and hormone problems.  BMI is just one measure of your risk for weight-related health problems. You may be at higher risk for health problems if you are not active, you eat an unhealthy diet, or you drink too much alcohol or use tobacco products.  Follow-up care is a key

## 2024-03-06 LAB — HIV AG/AB: NONREACTIVE

## 2024-03-08 ENCOUNTER — HOSPITAL ENCOUNTER (OUTPATIENT)
Age: 24
Setting detail: SPECIMEN
Discharge: HOME | End: 2024-03-08

## 2024-03-08 DIAGNOSIS — Z11.3 SCREEN FOR STD (SEXUALLY TRANSMITTED DISEASE): ICD-10-CM

## 2024-03-13 LAB
C TRACH DNA UR QL NAA+PROBE: NEGATIVE
N GONORRHOEA DNA UR QL NAA+PROBE: NEGATIVE

## 2024-03-26 NOTE — ASSESSMENT & PLAN NOTE
Migraines are infrequent associated symptoms include photophobia and nausea. Well-controlled, continue current medications Topamax.   Managed by DR Carlota Ragland
Past history of panic attacks and feelings of increased general anxiety. Denies past history of trauma or sexual abuse.  Well-controlled, continue current medications Zoloft and Klonopin prescribed by Jseus Wise
not applicable